# Patient Record
Sex: FEMALE | Race: WHITE | NOT HISPANIC OR LATINO | Employment: STUDENT | URBAN - METROPOLITAN AREA
[De-identification: names, ages, dates, MRNs, and addresses within clinical notes are randomized per-mention and may not be internally consistent; named-entity substitution may affect disease eponyms.]

---
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
## 2025-05-14 ENCOUNTER — APPOINTMENT (OUTPATIENT)
Dept: URGENT CARE | Age: 20
End: 2025-05-14

## 2025-06-26 ENCOUNTER — TELEMEDICINE (OUTPATIENT)
Dept: FAMILY MEDICINE CLINIC | Facility: CLINIC | Age: 20
End: 2025-06-26
Payer: COMMERCIAL

## 2025-06-26 DIAGNOSIS — B96.89 ACUTE BACTERIAL RHINOSINUSITIS: Primary | ICD-10-CM

## 2025-06-26 DIAGNOSIS — J01.90 ACUTE BACTERIAL RHINOSINUSITIS: Primary | ICD-10-CM

## 2025-06-26 PROCEDURE — 99213 OFFICE O/P EST LOW 20 MIN: CPT | Performed by: FAMILY MEDICINE

## 2025-06-26 NOTE — LETTER
June 26, 2025     Patient: Marielle Brown  YOB: 2005  Date of Visit: 6/26/2025      To Whom it May Concern:    Marielle Brown is under my professional care. Marielle was seen in my office on 6/26/2025. Marielle may return to work on 6/30/25.    If you have any questions or concerns, please don't hesitate to call.         Sincerely,          Katia Sanches, DO        CC: No Recipients

## 2025-06-26 NOTE — PROGRESS NOTES
Virtual Regular Visit  Name: Marielle Brown      : 2005      MRN: [id number]  Encounter Provider: Katia Sanches DO  Encounter Date: 2025   Encounter department: [facility]  :  Assessment & Plan  Acute bacterial rhinosinusitis    Suspect that this patient's symptoms may be related to side effect from a methylprednisolone.  Given that treatment with steroids is not necessary for a sinus infection recommend discontinuation of this.  Patient is advised that no weaning off of the medication is necessary.  Patient should complete course on Augmentin as prescribed.  Recommend follow-up in 1 to 2 days as needed should symptoms persist despite discontinuation of medication.  Work note is provided for return to work next week.             History of Present Illness     Marielle is a 20-year-old female who presents today for evaluation regarding follow-up following sinus infection.    She was seen at [facility] on  for symptoms of illness and was diagnosed with a sinus and ear infection.  She was prescribed a course on Augmentin and a Medrol Dosepak.  Notes episode yesterday at work where she almost passed out.  She did feel flushed in the face, developed tunnel vision, had the light headedness and a heat flash.  Notes that they checked her blood pressure which was fine.  She notes that she did have coffee in the morning and wonders if this may have interacted with the steroid.  She notes that she went back into work today and developed nausea and lightheadedness again and so did come home.  She has intermittently had some blurred vision with these episodes.  She has not had an issue with Augmentin when prescribed in the past.      Review of Systems   Constitutional: Negative.    HENT: Negative.     Eyes: Negative.    Respiratory: Negative.     Cardiovascular: Negative.    Gastrointestinal: Negative.    Endocrine: Negative.    Genitourinary: Negative.    Musculoskeletal: Negative.    Skin:  Negative.    Allergic/Immunologic: Negative.    Neurological: Negative.    Hematological: Negative.    Psychiatric/Behavioral: Negative.         Objective   There were no vitals taken for this visit.    Physical Exam  Constitutional:       General: She is not in acute distress.     Appearance: Normal appearance.   HENT:      Right Ear: External ear normal.      Left Ear: External ear normal.      Mouth/Throat:      Mouth: Mucous membranes are moist.     Eyes:      General: No scleral icterus.        Right eye: No discharge.         Left eye: No discharge.      Conjunctiva/sclera: Conjunctivae normal.     Pulmonary:      Comments: No conversational dyspnea noted    Musculoskeletal:      Cervical back: Normal range of motion.     Neurological:      Mental Status: She is alert.     Psychiatric:         Mood and Affect: Mood normal.         Behavior: Behavior normal.         Administrative Statements   Encounter provider Katia Sanches, DO    The Patient is located at Home and in the following state in which I hold an active license [place].    The patient was identified by name and date of birth. Marielle Brown was informed that this is a telemedicine visit and that the visit is being conducted through the Epic Embedded platform. She agrees to proceed..  My office door was closed. No one else was in the room.  She acknowledged consent and understanding of privacy and security of the video platform. The patient has agreed to participate and understands they can discontinue the visit at any time.    I have spent a total time of 18 minutes in caring for this patient on the day of the visit/encounter including Instructions for management, Patient and family education, Impressions, Documenting in the medical record, and Obtaining or reviewing history  , not including the time spent for establishing the audio/video connection.